# Patient Record
Sex: FEMALE | Race: WHITE | HISPANIC OR LATINO | Employment: UNEMPLOYED | URBAN - METROPOLITAN AREA
[De-identification: names, ages, dates, MRNs, and addresses within clinical notes are randomized per-mention and may not be internally consistent; named-entity substitution may affect disease eponyms.]

---

## 2023-08-17 ENCOUNTER — HOSPITAL ENCOUNTER (EMERGENCY)
Facility: HOSPITAL | Age: 38
Discharge: HOME | End: 2023-08-17
Attending: EMERGENCY MEDICINE

## 2023-08-17 VITALS
SYSTOLIC BLOOD PRESSURE: 119 MMHG | OXYGEN SATURATION: 100 % | DIASTOLIC BLOOD PRESSURE: 61 MMHG | BODY MASS INDEX: 27.49 KG/M2 | TEMPERATURE: 99 F | HEART RATE: 76 BPM | WEIGHT: 165 LBS | HEIGHT: 65 IN | RESPIRATION RATE: 18 BRPM

## 2023-08-17 DIAGNOSIS — O46.8X1 SUBCHORIONIC HEMATOMA IN FIRST TRIMESTER, SINGLE OR UNSPECIFIED FETUS: ICD-10-CM

## 2023-08-17 DIAGNOSIS — O41.8X10 SUBCHORIONIC HEMATOMA IN FIRST TRIMESTER, SINGLE OR UNSPECIFIED FETUS: ICD-10-CM

## 2023-08-17 DIAGNOSIS — O26.891 RH NEGATIVE STATUS DURING PREGNANCY IN FIRST TRIMESTER: ICD-10-CM

## 2023-08-17 DIAGNOSIS — O20.9 VAGINAL BLEEDING IN PREGNANCY, FIRST TRIMESTER: Primary | ICD-10-CM

## 2023-08-17 DIAGNOSIS — Z67.91 RH NEGATIVE STATUS DURING PREGNANCY IN FIRST TRIMESTER: ICD-10-CM

## 2023-08-17 DIAGNOSIS — O99.891 ASYMPTOMATIC BACTERIURIA DURING PREGNANCY: ICD-10-CM

## 2023-08-17 DIAGNOSIS — R82.71 ASYMPTOMATIC BACTERIURIA DURING PREGNANCY: ICD-10-CM

## 2023-08-17 LAB
ABO + RH BLD: NORMAL
ALBUMIN SERPL BCP-MCNC: 3.7 G/DL (ref 3.5–5.2)
ALP SERPL-CCNC: 70 U/L (ref 55–135)
ALT SERPL W/O P-5'-P-CCNC: 15 U/L (ref 10–44)
ANION GAP SERPL CALC-SCNC: 6 MMOL/L (ref 8–16)
AST SERPL-CCNC: 18 U/L (ref 10–40)
B-HCG UR QL: POSITIVE
BACTERIA GENITAL QL WET PREP: NORMAL
BASOPHILS # BLD AUTO: 0.01 K/UL (ref 0–0.2)
BASOPHILS NFR BLD: 0.1 % (ref 0–1.9)
BILIRUB SERPL-MCNC: 0.4 MG/DL (ref 0.1–1)
BILIRUB UR QL STRIP: NEGATIVE
BLD GP AB SCN CELLS X3 SERPL QL: NORMAL
BUN SERPL-MCNC: 9 MG/DL (ref 6–20)
CALCIUM SERPL-MCNC: 9.1 MG/DL (ref 8.7–10.5)
CHLORIDE SERPL-SCNC: 104 MMOL/L (ref 95–110)
CLARITY UR: ABNORMAL
CLUE CELLS VAG QL WET PREP: NORMAL
CO2 SERPL-SCNC: 24 MMOL/L (ref 23–29)
COLOR UR: YELLOW
CREAT SERPL-MCNC: 0.7 MG/DL (ref 0.5–1.4)
CTP QC/QA: YES
DIFFERENTIAL METHOD: ABNORMAL
EOSINOPHIL # BLD AUTO: 0 K/UL (ref 0–0.5)
EOSINOPHIL NFR BLD: 0.3 % (ref 0–8)
ERYTHROCYTE [DISTWIDTH] IN BLOOD BY AUTOMATED COUNT: 11.8 % (ref 11.5–14.5)
EST. GFR  (NO RACE VARIABLE): >60 ML/MIN/1.73 M^2
FETAL CELL SCN BLD QL ROSETTE: NORMAL
FILAMENT FUNGI VAG WET PREP-#/AREA: NORMAL
GLUCOSE SERPL-MCNC: 116 MG/DL (ref 70–110)
GLUCOSE UR QL STRIP: NEGATIVE
HCG INTACT+B SERPL-ACNC: NORMAL MIU/ML
HCT VFR BLD AUTO: 35.8 % (ref 37–48.5)
HGB BLD-MCNC: 12.5 G/DL (ref 12–16)
HGB UR QL STRIP: NEGATIVE
IMM GRANULOCYTES # BLD AUTO: 0.03 K/UL (ref 0–0.04)
IMM GRANULOCYTES NFR BLD AUTO: 0.4 % (ref 0–0.5)
INJECT RH IG VOL PATIENT: NORMAL ML
KETONES UR QL STRIP: NEGATIVE
LEUKOCYTE ESTERASE UR QL STRIP: ABNORMAL
LYMPHOCYTES # BLD AUTO: 1.5 K/UL (ref 1–4.8)
LYMPHOCYTES NFR BLD: 21 % (ref 18–48)
MCH RBC QN AUTO: 31.7 PG (ref 27–31)
MCHC RBC AUTO-ENTMCNC: 34.9 G/DL (ref 32–36)
MCV RBC AUTO: 91 FL (ref 82–98)
MICROSCOPIC COMMENT: NORMAL
MONOCYTES # BLD AUTO: 0.5 K/UL (ref 0.3–1)
MONOCYTES NFR BLD: 6.8 % (ref 4–15)
NEUTROPHILS # BLD AUTO: 5.3 K/UL (ref 1.8–7.7)
NEUTROPHILS NFR BLD: 71.4 % (ref 38–73)
NITRITE UR QL STRIP: NEGATIVE
NRBC BLD-RTO: 0 /100 WBC
PH UR STRIP: 8 [PH] (ref 5–8)
PLATELET # BLD AUTO: ABNORMAL K/UL (ref 150–450)
PMV BLD AUTO: ABNORMAL FL (ref 9.2–12.9)
POTASSIUM SERPL-SCNC: 4 MMOL/L (ref 3.5–5.1)
PROT SERPL-MCNC: 7.2 G/DL (ref 6–8.4)
PROT UR QL STRIP: NEGATIVE
RBC # BLD AUTO: 3.94 M/UL (ref 4–5.4)
RBC #/AREA URNS HPF: 2 /HPF (ref 0–4)
RH BLD: NORMAL
SODIUM SERPL-SCNC: 134 MMOL/L (ref 136–145)
SP GR UR STRIP: 1.02 (ref 1–1.03)
SPECIMEN SOURCE: NORMAL
SQUAMOUS #/AREA URNS HPF: 27 /HPF
T VAGINALIS GENITAL QL WET PREP: NORMAL
URN SPEC COLLECT METH UR: ABNORMAL
UROBILINOGEN UR STRIP-ACNC: NEGATIVE EU/DL
WBC # BLD AUTO: 7.35 K/UL (ref 3.9–12.7)
WBC #/AREA URNS HPF: 0 /HPF (ref 0–5)
WBC #/AREA VAG WET PREP: NORMAL
YEAST GENITAL QL WET PREP: NORMAL

## 2023-08-17 PROCEDURE — 63600519 RHOGAM PHARM REV CODE 636 ALT 250 W HCPCS: Performed by: PHYSICIAN ASSISTANT

## 2023-08-17 PROCEDURE — 85461 HEMOGLOBIN FETAL: CPT | Performed by: EMERGENCY MEDICINE

## 2023-08-17 PROCEDURE — 87591 N.GONORRHOEAE DNA AMP PROB: CPT | Performed by: PHYSICIAN ASSISTANT

## 2023-08-17 PROCEDURE — 96360 HYDRATION IV INFUSION INIT: CPT

## 2023-08-17 PROCEDURE — 80053 COMPREHEN METABOLIC PANEL: CPT | Performed by: EMERGENCY MEDICINE

## 2023-08-17 PROCEDURE — 81025 URINE PREGNANCY TEST: CPT | Performed by: EMERGENCY MEDICINE

## 2023-08-17 PROCEDURE — 86900 BLOOD TYPING SEROLOGIC ABO: CPT | Performed by: EMERGENCY MEDICINE

## 2023-08-17 PROCEDURE — 87210 SMEAR WET MOUNT SALINE/INK: CPT | Performed by: PHYSICIAN ASSISTANT

## 2023-08-17 PROCEDURE — 99284 EMERGENCY DEPT VISIT MOD MDM: CPT | Mod: 25

## 2023-08-17 PROCEDURE — 86850 RBC ANTIBODY SCREEN: CPT | Performed by: EMERGENCY MEDICINE

## 2023-08-17 PROCEDURE — 96372 THER/PROPH/DIAG INJ SC/IM: CPT | Mod: 59 | Performed by: PHYSICIAN ASSISTANT

## 2023-08-17 PROCEDURE — 84702 CHORIONIC GONADOTROPIN TEST: CPT | Performed by: EMERGENCY MEDICINE

## 2023-08-17 PROCEDURE — 25000003 PHARM REV CODE 250: Performed by: EMERGENCY MEDICINE

## 2023-08-17 PROCEDURE — 86901 BLOOD TYPING SEROLOGIC RH(D): CPT | Performed by: EMERGENCY MEDICINE

## 2023-08-17 PROCEDURE — 81000 URINALYSIS NONAUTO W/SCOPE: CPT | Performed by: EMERGENCY MEDICINE

## 2023-08-17 PROCEDURE — 85025 COMPLETE CBC W/AUTO DIFF WBC: CPT | Performed by: EMERGENCY MEDICINE

## 2023-08-17 RX ORDER — CEPHALEXIN 500 MG/1
500 CAPSULE ORAL 4 TIMES DAILY
Qty: 20 CAPSULE | Refills: 0 | Status: SHIPPED | OUTPATIENT
Start: 2023-08-17 | End: 2023-08-17 | Stop reason: SDUPTHER

## 2023-08-17 RX ORDER — CEPHALEXIN 500 MG/1
500 CAPSULE ORAL 4 TIMES DAILY
Qty: 20 CAPSULE | Refills: 0 | Status: SHIPPED | OUTPATIENT
Start: 2023-08-17 | End: 2023-08-22

## 2023-08-17 RX ADMIN — SODIUM CHLORIDE 1000 ML: 9 INJECTION, SOLUTION INTRAVENOUS at 11:08

## 2023-08-17 RX ADMIN — HUMAN RHO(D) IMMUNE GLOBULIN 300 MCG: 300 INJECTION, SOLUTION INTRAMUSCULAR at 03:08

## 2023-08-17 NOTE — DISCHARGE INSTRUCTIONS
Llame al obstetra y ginecólogo que se indica a continuación para programar charlene ryland ambulatoria para revisar más a fondo los hallazgos de caal ultrasonido hoy y realizar pruebas prenatales. Asegúrese de seguir las instrucciones de descanso pélvico, que incluyen no levantar objetos pesados y nada en el canal vaginal, incluidas las relaciones sexuales, hasta que caal ginecólogo lo autorice. Puede akosua Tylenol de venta li según sea necesario para el dolor. Kappa los antibióticos recetados de acuerdo con las instrucciones del frasco para charlene posible infección del tracto urinario.

## 2023-08-17 NOTE — ED TRIAGE NOTES
Pt reports to ED with CC of pelvic pain and lower back pain (7/10) that started today. States that she is having light vaginal bleeding. LMP 6/31/2023

## 2023-08-17 NOTE — ED PROVIDER NOTES
Encounter Date: 2023    SCRIBE #1 NOTE: I, Dany Flynn, am scribing for, and in the presence of,  Maria Del Carmen Alvarez PA-C. Other sections scribed: HPI, ROS, PE.       History     Chief Complaint   Patient presents with    Vaginal Bleeding     Pt reports being appx 3-4 weeks pregnant and started having spotting this morning after using the restroom. Pt also reports now having pelvic cramping and lower back pain. LMP        CC: Vaginal bleeding    HPI: This is a 38 y.o.  F who is currently pregnant (pt estimates 3-4 weeks gestation however has not had any workup with OBGYN so far) with no PMHx who presents to the ED for emergent evaluation of acute vaginal bleeding that began today. Pt has associated mild lower abdominal and lower back cramping pain that began today as well. No OTC treatment attempted PTA. Pt also reports upper abdominal pain that began 3 days ago. She describes the upper abdominal pain as feeling like gas. Pt endorses constant fatigue. She has no known drug allergies. Pt denies fever, dizziness, lightheadedness, room spinning sensation, or lightheadedness. LMP was .      The history is provided by the patient. The history is limited by a language barrier. A  was used ([employer]  000735 used for interpretation.).     Review of patient's allergies indicates:  No Known Allergies  History reviewed. No pertinent past medical history.  History reviewed. No pertinent surgical history.  History reviewed. No pertinent family history.  Social History     Tobacco Use    Smoking status: Never    Smokeless tobacco: Never   Substance Use Topics    Drug use: Never     Review of Systems   Constitutional:  Positive for fatigue. Negative for chills and fever.   HENT:  Negative for congestion, sinus pressure, sneezing and sore throat.    Respiratory:  Negative for cough and shortness of breath.    Cardiovascular:  Negative for chest pain.   Gastrointestinal:   Positive for abdominal pain (upper and lower). Negative for diarrhea, nausea and vomiting.   Genitourinary:  Positive for vaginal bleeding. Negative for dysuria.   Musculoskeletal:  Positive for back pain (lower).   Skin:  Negative for rash.   Neurological:  Negative for dizziness, weakness and light-headedness.   Hematological:  Does not bruise/bleed easily.       Physical Exam     Initial Vitals [08/17/23 1130]   BP Pulse Resp Temp SpO2   (!) 123/59 86 18 98.9 °F (37.2 °C) 99 %      MAP       --         Physical Exam    Nursing note and vitals reviewed.  Constitutional: She appears well-developed and well-nourished. She is not diaphoretic. No distress.   HENT:   Head: Normocephalic and atraumatic.   Right Ear: External ear normal.   Left Ear: External ear normal.   Cardiovascular:  Normal rate, regular rhythm and intact distal pulses.           Pulmonary/Chest: Breath sounds normal. No respiratory distress.   Abdominal: Abdomen is soft. Bowel sounds are normal. There is no abdominal tenderness.   Genitourinary:    Genitourinary Comments: There is white vaginal discharge. There is no vaginal bleeding. The cervical os is closed. There is no foreign body. There is no uterine tenderness. There is mild tenderness of the right adnexa. There is moderate tenderness of the left adnexa.       Neurological: She is alert and oriented to person, place, and time. GCS score is 15. GCS eye subscore is 4. GCS verbal subscore is 5. GCS motor subscore is 6.   Skin: Skin is warm and dry.   Psychiatric: She has a normal mood and affect. Her behavior is normal.         ED Course   Procedures  Labs Reviewed   CBC W/ AUTO DIFFERENTIAL - Abnormal; Notable for the following components:       Result Value    RBC 3.94 (*)     Hematocrit 35.8 (*)     MCH 31.7 (*)     All other components within normal limits    Narrative:     Release to patient->Immediate   COMPREHENSIVE METABOLIC PANEL - Abnormal; Notable for the following components:     Sodium 134 (*)     Glucose 116 (*)     Anion Gap 6 (*)     All other components within normal limits    Narrative:     Release to patient->Immediate   URINALYSIS, REFLEX TO URINE CULTURE - Abnormal; Notable for the following components:    Appearance, UA Hazy (*)     Leukocytes, UA Trace (*)     All other components within normal limits    Narrative:     Specimen Source->Urine   POCT URINE PREGNANCY - Abnormal; Notable for the following components:    POC Preg Test, Ur Positive (*)     All other components within normal limits   VAGINAL SCREEN    Narrative:     Release to patient->Immediate   C. TRACHOMATIS/N. GONORRHOEAE BY AMP DNA   HCG, QUANTITATIVE    Narrative:     Release to patient->Immediate   URINALYSIS MICROSCOPIC    Narrative:     Specimen Source->Urine   GROUP & RH   RH TYPING   INDIRECT ANTIGLOBULIN TEST   IAIN - [facility] (FETAL BLEED SCREEN)   PREPARE RH IMMUNE GLOBULIN          Imaging Results              US OB <14 Wks TransAbd & TransVag, Single Gestation (XPD) (Final result)  Result time 08/17/23 13:13:41      Final result by Pat Rae MD (08/17/23 13:13:41)                   Impression:      Single live intrauterine pregnancy with estimated gestational age 7 weeks 1 days.    Small subchorionic hemorrhage.      Electronically signed by: Pat Rae MD  Date:    08/17/2023  Time:    13:13               Narrative:    EXAMINATION:  US OB <14 WEEKS, TRANSABDOM & TRANSVAG, SINGLE GESTATION (XPD)    CLINICAL HISTORY:  Vag Bleeding;    TECHNIQUE:  Transabdominal sonography of the pelvis was performed, followed by transvaginal sonography to better evaluate the uterus and ovaries.    COMPARISON:  None.    FINDINGS:  The uterus measures 11.1 x 5.5 x 6.1 cm.  There is an intrauterine gestational sac containing a fetal pole and yolk sac.    Intrauterine gestation(s): Single    Crown-rump length (CRL): 10.1 mm    Cardiac activity: 147 bpm    Subchorionic hemorrhage: 1.2 x 1.0 x 1.1  cm    Right ovary: Normal, 2.7 x 1.3 x 2.2 cm.    Left ovary: Normal, 3.1 x 1.8 x 2.6 cm.    Miscellaneous: No Free Fluid.                                       Medications   sodium chloride 0.9% bolus 1,000 mL 1,000 mL (0 mLs Intravenous Stopped 23 1245)   rho(D) immune globulin injection 300 mcg (300 mcg Intramuscular Given 23 1550)     Medical Decision Making:   Differential Diagnosis:   Bleeding in early pregnancy, threatened , inevitable  ectopic pregnancy  Clinical Tests:   Lab Tests: Ordered and Reviewed       <> Summary of Lab: Please see ED course below for lab interpretation    Radiological Study: Ordered and Reviewed  ED Management:  This is a 38 y.o.  F who is currently pregnant (pt estimates 3-4 weeks gestation however has not had any workup with OBGYN so far) with no PMHx who presents to the ED for emergent evaluation of acute vaginal bleeding that began today. Pt has associated mild lower abdominal and lower back cramping pain that began today as well. No OTC treatment attempted PTA. Pt also reports upper abdominal pain that began 3 days ago. She describes the upper abdominal pain as feeling like gas. Pt endorses constant fatigue. She has no known drug allergies. Pt denies fever, dizziness, lightheadedness, room spinning sensation, or lightheadedness. LMP was . Exam above.  Beta-hCG consistent with between 1 and 10 weeks pregnancy.  Urinalysis positive for trace leukocytes.  CBC and CMP within normal limits.  Blood type is O negative, and therefore RhoGAM was given to patient today while in the ED. Ultrasound resulted positive for single live intrauterine pregnancy measuring 7 weeks and 1 day.  There was a small subchorionic hemorrhage noted.  Counseled patient on findings.  I recommend pelvic rest, no heavy lifting and nothing inserted into the vagina until she was able to follow up with her OBGYN.  Vaginal swab negative for yeast, BV and Trichomonas.   "Gonorrhea/chlamydia swab is pending at this time.  Patient was provided a referral to OBGYN for further prenatal care and to review ultrasound findings.          Scribe Attestation:   Scribe #1: I performed the above scribed service and the documentation accurately describes the services I performed. I attest to the accuracy of the note.        ED Course as of 08/17/23 1922   Thu Aug 17, 2023   1216 POCT urine pregnancy(!)  positive [MB]   1216 Urinalysis, Reflex to Urine Culture Urine, Clean Catch(!)  Trace CLARISSE [MB]   1402 CBC W/ AUTO DIFFERENTIAL(!)  Normal WBC count, normal Hgb [MB]   1402 Comp. Metabolic Panel(!)  Mild hyponatremia [MB]   1403 ABO/Rh  O neg, will need rho yunior [MB]   1403 Vaginal Screen  Neg for yeast, BV and trichomonas [MB]   1404 hCG, quantitative  Consistent with 1-10 weeks pregnancy [MB]   1405 US OB <14 Wks TransAbd & TransVag, Single Gestation (XPD)  "Single live intrauterine pregnancy with estimated gestational age 7 weeks 1 days.     Small subchorionic hemorrhage." [MB]      ED Course User Index  [MB] Maria Del Carmen Alvarez PA-C                 I, Maria Del Carmen Alvarez, personally performed the services described in this documentation. All medical record entries made by the scribe were at my direction and in my presence. I have reviewed the chart and agree that the record reflects my personal performance and is accurate and complete.    Clinical Impression:   Final diagnoses:  [O26.891, Z67.91] Rh negative status during pregnancy in first trimester  [O20.9] Vaginal bleeding in pregnancy, first trimester (Primary)  [O41.8X10, O46.8X1] Subchorionic hematoma in first trimester, single or unspecified fetus  [O99.891, R82.71] Asymptomatic bacteriuria during pregnancy        ED Disposition Condition    Discharge Stable          ED Prescriptions       Medication Sig Dispense Start Date End Date Auth. Provider    cephALEXin (KEFLEX) 500 MG capsule  (Status: Discontinued) Take 1 capsule (500 mg total) by mouth " 4 (four) times daily. for 5 days 20 capsule 8/17/2023 8/17/2023 Maria Del Carmen Alvarez PA-C    cephALEXin (KEFLEX) 500 MG capsule Take 1 capsule (500 mg total) by mouth 4 (four) times daily. for 5 days 20 capsule 8/17/2023 8/22/2023 Maria Del Carmen Alvarez PA-C          Follow-up Information       Follow up With Specialties Details Why Contact Info    Marquis Corbin MD Obstetrics and Gynecology Schedule an appointment as soon as possible for a visit in 2 days For follow up [address]  [address]  [facility] [address]  [phone]      [facility] [place] Emergency Medicine Go to  As needed, If symptoms worsen [address] [facility] [id number] [phone]             Maria Del Carmen Alvarez PA-C  08/17/23 1922

## 2023-08-18 LAB
C TRACH DNA SPEC QL NAA+PROBE: NOT DETECTED
N GONORRHOEA DNA SPEC QL NAA+PROBE: NOT DETECTED

## 2025-07-12 ENCOUNTER — HOSPITAL ENCOUNTER (EMERGENCY)
Facility: HOSPITAL | Age: 40
Discharge: HOME | End: 2025-07-12
Attending: EMERGENCY MEDICINE

## 2025-07-12 VITALS
SYSTOLIC BLOOD PRESSURE: 133 MMHG | DIASTOLIC BLOOD PRESSURE: 83 MMHG | TEMPERATURE: 99 F | HEIGHT: 64 IN | WEIGHT: 170 LBS | BODY MASS INDEX: 29.02 KG/M2 | HEART RATE: 62 BPM | OXYGEN SATURATION: 100 % | RESPIRATION RATE: 17 BRPM

## 2025-07-12 DIAGNOSIS — M54.2 NECK PAIN: ICD-10-CM

## 2025-07-12 DIAGNOSIS — R42 DIZZINESS: ICD-10-CM

## 2025-07-12 LAB
B-HCG UR QL: NEGATIVE
CTP QC/QA: YES
GLUCOSE SERPL-MCNC: 89 MG/DL (ref 70–110)

## 2025-07-12 PROCEDURE — 93010 ELECTROCARDIOGRAM REPORT: CPT | Mod: ,,, | Performed by: INTERNAL MEDICINE

## 2025-07-12 PROCEDURE — 93005 ELECTROCARDIOGRAM TRACING: CPT

## 2025-07-12 PROCEDURE — 96372 THER/PROPH/DIAG INJ SC/IM: CPT

## 2025-07-12 PROCEDURE — 99284 EMERGENCY DEPT VISIT MOD MDM: CPT | Mod: 25

## 2025-07-12 PROCEDURE — 81025 URINE PREGNANCY TEST: CPT

## 2025-07-12 PROCEDURE — 63600175 PHARM REV CODE 636 W HCPCS: Mod: JZ,TB

## 2025-07-12 PROCEDURE — 82962 GLUCOSE BLOOD TEST: CPT

## 2025-07-12 RX ORDER — KETOROLAC TROMETHAMINE 30 MG/ML
30 INJECTION, SOLUTION INTRAMUSCULAR; INTRAVENOUS
Status: COMPLETED | OUTPATIENT
Start: 2025-07-12 | End: 2025-07-12

## 2025-07-12 RX ORDER — LIDOCAINE 50 MG/G
1 PATCH TOPICAL DAILY
Qty: 14 PATCH | Refills: 0 | Status: SHIPPED | OUTPATIENT
Start: 2025-07-12 | End: 2025-07-12 | Stop reason: CLARIF

## 2025-07-12 RX ORDER — IBUPROFEN 600 MG/1
600 TABLET, FILM COATED ORAL EVERY 6 HOURS PRN
Qty: 20 TABLET | Refills: 0 | Status: SHIPPED | OUTPATIENT
Start: 2025-07-12

## 2025-07-12 RX ADMIN — KETOROLAC TROMETHAMINE 30 MG: 30 INJECTION, SOLUTION INTRAMUSCULAR; INTRAVENOUS at 03:07

## 2025-07-12 NOTE — Clinical Note
"Elena "Elena" Theresa Tarango was seen and treated in our emergency department on 7/12/2025.  She may return to work on 07/14/2025.       If you have any questions or concerns, please don't hesitate to call.      Vijay De León PA-C"

## 2025-07-12 NOTE — DISCHARGE INSTRUCTIONS
[place] la medicación según lo prescrito. Acuda a un seguimiento con caal médico de cabecera y caal traumatólogo para charlene evaluación más exhaustiva y el manejo de los síntomas. Recomiendo fisioterapia.reposo, hielo, elevar brazo/hombro.  [place] por venir a nuestro Departamento de Emergencias hoy. Es importante recordar que algunos problemas son difíciles de diagnosticar y es posible que no se detecten bianca caal primera visita. Asegúrese de hacer un seguimiento con caal médico de atención primaria.  Si no tiene maral, puede comunicarse con el que figura en caal documentación de lissette o también puede llamar a la [facility] al [phone] para programar charlene ryland con maral.    Regrese a la kenny de emergencias con cualquier pregunta / inquietud, síntomas nuevos / preocupantes, empeoramiento o falta de mejora. No conduzca ni tome decisiones importantes bianca 24 horas si ha recibido analgésicos, sedantes o fármacos que alteran el estado de ánimo bianca caal visita a la kenny de emergencias.

## 2025-07-12 NOTE — ED PROVIDER NOTES
Encounter Date: 7/12/2025    SCRIBE #1 NOTE: I, Juliane Gene, am scribing for, and in the presence of,  Vijay De León PA-C.       History     Chief Complaint   Patient presents with    Neck Pain     a    Arm Pain     Pt to ED from home with c/o left sided neck and arm pain x 1 week which has been progressively getting worse. Pt denies falls, trauma, numbness or tingling.      40 y.o. female with no pertinent PMHx presents to the ED for evaluation of worsening left arm pain that radiates to the neck x1 week. She states that her pain became severe last night and she is unable to raise her arm secondary to pain. Pt reports associated numbness to the left hand and tingling to the fingers. Pt denies any CP or fever. She denies any known trauma or recent heavy lifting. She has attempted treatment with acetaminophen this morning.    The history is provided by the patient. A  was used ().     Review of patient's allergies indicates:  No Known Allergies  No past medical history on file.  No past surgical history on file.  No family history on file.  Social History[1]  Review of Systems   Constitutional:  Negative for chills and fever.   HENT:  Negative for congestion and sore throat.    Eyes:  Negative for visual disturbance.   Respiratory:  Negative for cough and shortness of breath.    Cardiovascular:  Negative for chest pain.   Gastrointestinal:  Negative for abdominal pain, nausea and vomiting.   Genitourinary:  Negative for dysuria.   Musculoskeletal:  Positive for myalgias (left arm) and neck pain (left neck). Negative for back pain, gait problem and neck stiffness.   Skin:  Negative for rash.   Neurological:  Positive for dizziness and numbness. Negative for syncope and headaches.        (+) paresthesia    Psychiatric/Behavioral:  Negative for decreased concentration.        Physical Exam     Initial Vitals [07/12/25 1304]   BP Pulse Resp Temp SpO2   115/80 81 17 98.5 °F (36.9 °C) 98 %       MAP       --         Physical Exam    Nursing note and vitals reviewed.  Constitutional: She appears well-developed and well-nourished.   HENT:   Head: Normocephalic and atraumatic.   Right Ear: External ear normal.   Left Ear: External ear normal.   Nose: Nose normal. Mouth/Throat: Oropharynx is clear and moist.   Eyes: Conjunctivae and lids are normal.   Neck: Trachea normal. Neck supple.   Normal range of motion.   Full passive range of motion without pain.     Cardiovascular:  Normal rate, regular rhythm, S1 normal, S2 normal, normal heart sounds and normal pulses.     Exam reveals no gallop and no friction rub.       No murmur heard.  Pulmonary/Chest: Effort normal and breath sounds normal. No respiratory distress. She has no decreased breath sounds. She has no wheezes. She has no rhonchi. She has no rales.   Abdominal: Abdomen is soft. Bowel sounds are normal. She exhibits no distension. There is no abdominal tenderness. There is no rebound, no guarding, no tenderness at McBurney's point and negative Sibley's sign.   Musculoskeletal:         General: Normal range of motion.      Cervical back: Full passive range of motion without pain, normal range of motion and neck supple. No rigidity. Spinous process tenderness and muscular tenderness present. Normal range of motion.      Comments: Tenderness to palpation at the left trapezius.  Paraspinal tenderness in the cervical spine.  In his process tenderness at cervical spine.  Muscular tenderness greater than bony tenderness.  Full range of motion in the neck.  Negative Spurling test.  Sensation intact down the left arm.  2+ radial pulses.  Capillary refill less than 2 seconds.  Neurovascularly intact.  Decreased range of motion in the left shoulder due to pain.  Patient has multi with flexion and extension of the shoulder due to pain.     Lymphadenopathy:     She has no cervical adenopathy.   Neurological: She is alert. She has normal strength. No sensory  deficit.   Skin: Skin is warm and dry.   Psychiatric: She has a normal mood and affect.         ED Course   Procedures  Labs Reviewed   POCT URINE PREGNANCY   POCT GLUCOSE MONITORING CONTINUOUS          Imaging Results    None          Medications - No data to display  Medical Decision Making  Encounter Date: 7/12/2025    40 y.o. female presents for evaluation of left arm pain that radiates to the neck x1 week. She states that her pain became severe last night and she is unable to raise her arm secondary to pain. Pt reports associated numbness to the left hand and tingling to the fingers.  Hemodynamically stable. Afebrile. Phonating and protecting the airway spontaneously. No clinical evidence for cardiovascular instability or impending airway compromise.  Remainder of physical exam as above.   Prior medical records reviewed. PMD note reviewed. Current co-morbidities considered that will impact clinical decision making include as above.   Vitals range:   Temp:  [98.5 °F (36.9 °C)] 98.5 °F (36.9 °C)  Pulse:  [81] 81  Resp:  [17] 17  SpO2:  [98 %] 98 %  BP: (115)/(80) 115/80    Denies fever, visual disturbance, CP, and SOB. Afebrile. Non-toxic. No personal Hx of CA, RA, ankylosing spondylitis, psoriatic spondyloarthropathy, or IV drug use. Not anticoagulated.  I've carefully considered but have lower suspicion for fracture/dislocation, epidural abscess, meningitis, vertebral osteomyelitis, transverse myelitis, temporal arteritis, carotid dissection, occult cardiac/pulmonary process, and malignant etiology requiring emergent workup at this time. No overlying cellulitis or shingles. XR cervical spine shows mild disc space narrowing and spondylosis seen at the C4-5 level.  X-ray shoulder shows mild AC joint arthropathy is noted.  Amorphous calcification seen in the expected location of the rotator cuff consistent with calcium hydroxyapatite deposition within the cuff.  There appears to be minimal spurring along the  inferior margin of the humeral head consistent with mild degenerative change.  Considering these x-ray findings, I advised patient to follow up with the medical doctor for further evaluation and management of symptoms. Pain control with Toradol in the emergency department.     ED MEDS GIVEN:  Medications - No data to display    Clinical Impression:  Final diagnoses:  [R42] Dizziness  [M54.2] Neck pain    I discussed with the patient/family the diagnosis, treatment plan, indications for return to the emergency department, and for expected follow-up. The patient/family verbalized an understanding. The patient/family is asked if there are any questions or concerns. We discuss the case, until all issues are addressed to the patient/family's satisfaction. Patient/family understands and is agreeable to the plan.   Vijay De León    DISCLAIMER: This note was prepared with [employer]rockit voice recognition transcription software.       Amount and/or Complexity of Data Reviewed  Labs: ordered. Decision-making details documented in ED Course.  Radiology: ordered. Decision-making details documented in ED Course.  ECG/medicine tests: ordered and independent interpretation performed. Decision-making details documented in ED Course.    Risk  Prescription drug management.            Scribe Attestation:   Scribe #1: I performed the above scribed service and the documentation accurately describes the services I performed. I attest to the accuracy of the note.                          I, Vijay De León PA-C, personally performed the services described in this documentation. All medical record entries made by the scribe were at my direction and in my presence. I have reviewed the chart and agree that the record reflects my personal performance and is accurate and complete.      DISCLAIMER: This note was prepared with MModal voice recognition transcription software. Garbled syntax, mangled pronouns, and other bizarre constructions may be  attributed to that software system.           Clinical Impression:  Final diagnoses:  [R42] Dizziness  [M54.2] Neck pain                       [1]   Social History  Tobacco Use    Smoking status: Never    Smokeless tobacco: Never   Substance Use Topics    Drug use: Never        Vijay De León PA-C  07/14/25 1455

## 2025-07-13 LAB
OHS QRS DURATION: 96 MS
OHS QTC CALCULATION: 406 MS